# Patient Record
Sex: FEMALE | Race: WHITE | Employment: FULL TIME | URBAN - NONMETROPOLITAN AREA
[De-identification: names, ages, dates, MRNs, and addresses within clinical notes are randomized per-mention and may not be internally consistent; named-entity substitution may affect disease eponyms.]

---

## 2022-07-07 ENCOUNTER — HOSPITAL ENCOUNTER (EMERGENCY)
Age: 37
Discharge: HOME | End: 2022-07-07
Attending: SPECIALIST
Payer: MEDICAID

## 2022-07-07 ENCOUNTER — APPOINTMENT (OUTPATIENT)
Dept: GENERAL RADIOLOGY | Age: 37
End: 2022-07-07
Payer: MEDICAID

## 2022-07-07 VITALS
BODY MASS INDEX: 37.92 KG/M2 | WEIGHT: 227.6 LBS | HEART RATE: 89 BPM | DIASTOLIC BLOOD PRESSURE: 59 MMHG | SYSTOLIC BLOOD PRESSURE: 124 MMHG | RESPIRATION RATE: 17 BRPM | TEMPERATURE: 97.4 F | OXYGEN SATURATION: 96 % | HEIGHT: 65 IN

## 2022-07-07 DIAGNOSIS — D64.9 ANEMIA, UNSPECIFIED TYPE: ICD-10-CM

## 2022-07-07 DIAGNOSIS — R07.9 CHEST PAIN, UNSPECIFIED TYPE: Primary | ICD-10-CM

## 2022-07-07 LAB
ALBUMIN SERPL-MCNC: 3.9 G/DL (ref 3.5–5.2)
ALBUMIN/GLOBULIN RATIO: 1.2 (ref 1–2.5)
ALP BLD-CCNC: 68 U/L (ref 35–104)
ALT SERPL-CCNC: 19 U/L (ref 5–33)
ANION GAP SERPL CALCULATED.3IONS-SCNC: 11 MMOL/L (ref 9–17)
AST SERPL-CCNC: 17 U/L
BILIRUB SERPL-MCNC: <0.1 MG/DL (ref 0.3–1.2)
BUN BLDV-MCNC: 15 MG/DL (ref 6–20)
BUN/CREAT BLD: 23 (ref 9–20)
CALCIUM SERPL-MCNC: 9.2 MG/DL (ref 8.6–10.4)
CHLORIDE BLD-SCNC: 102 MMOL/L (ref 98–107)
CO2: 25 MMOL/L (ref 20–31)
CREAT SERPL-MCNC: 0.66 MG/DL (ref 0.5–0.9)
D-DIMER QUANTITATIVE: 0.46 MG/L FEU (ref 0–0.59)
EKG ATRIAL RATE: 88 BPM
EKG P AXIS: 68 DEGREES
EKG P-R INTERVAL: 152 MS
EKG Q-T INTERVAL: 370 MS
EKG QRS DURATION: 94 MS
EKG QTC CALCULATION (BAZETT): 447 MS
EKG R AXIS: 3 DEGREES
EKG T AXIS: 31 DEGREES
EKG VENTRICULAR RATE: 88 BPM
GFR AFRICAN AMERICAN: >60 ML/MIN
GFR NON-AFRICAN AMERICAN: >60 ML/MIN
GFR SERPL CREATININE-BSD FRML MDRD: ABNORMAL ML/MIN/{1.73_M2}
GLUCOSE BLD-MCNC: 91 MG/DL (ref 70–99)
HCT VFR BLD CALC: 31.4 % (ref 36.3–47.1)
HEMOGLOBIN: 9.9 G/DL (ref 11.9–15.1)
MAGNESIUM: 2 MG/DL (ref 1.6–2.6)
MCH RBC QN AUTO: 24.2 PG (ref 25.2–33.5)
MCHC RBC AUTO-ENTMCNC: 31.5 G/DL (ref 25.2–33.5)
MCV RBC AUTO: 76.8 FL (ref 82.6–102.9)
NRBC AUTOMATED: 0 PER 100 WBC
PDW BLD-RTO: 15.8 % (ref 11.8–14.4)
PLATELET # BLD: 418 K/UL (ref 138–453)
PMV BLD AUTO: 9.7 FL (ref 8.1–13.5)
POTASSIUM SERPL-SCNC: 3.8 MMOL/L (ref 3.7–5.3)
PRO-BNP: <20 PG/ML
RBC # BLD: 4.09 M/UL (ref 3.95–5.11)
SODIUM BLD-SCNC: 138 MMOL/L (ref 135–144)
TOTAL PROTEIN: 7.1 G/DL (ref 6.4–8.3)
TROPONIN, HIGH SENSITIVITY: <6 NG/L (ref 0–14)
WBC # BLD: 7.8 K/UL (ref 3.5–11.3)

## 2022-07-07 PROCEDURE — 99285 EMERGENCY DEPT VISIT HI MDM: CPT

## 2022-07-07 PROCEDURE — 84484 ASSAY OF TROPONIN QUANT: CPT

## 2022-07-07 PROCEDURE — 85379 FIBRIN DEGRADATION QUANT: CPT

## 2022-07-07 PROCEDURE — 6370000000 HC RX 637 (ALT 250 FOR IP): Performed by: SPECIALIST

## 2022-07-07 PROCEDURE — 36415 COLL VENOUS BLD VENIPUNCTURE: CPT

## 2022-07-07 PROCEDURE — 71045 X-RAY EXAM CHEST 1 VIEW: CPT

## 2022-07-07 PROCEDURE — 85027 COMPLETE CBC AUTOMATED: CPT

## 2022-07-07 PROCEDURE — 83735 ASSAY OF MAGNESIUM: CPT

## 2022-07-07 PROCEDURE — 83880 ASSAY OF NATRIURETIC PEPTIDE: CPT

## 2022-07-07 PROCEDURE — 80053 COMPREHEN METABOLIC PANEL: CPT

## 2022-07-07 PROCEDURE — 93005 ELECTROCARDIOGRAM TRACING: CPT | Performed by: SPECIALIST

## 2022-07-07 RX ORDER — ASPIRIN 81 MG/1
324 TABLET, CHEWABLE ORAL ONCE
Status: COMPLETED | OUTPATIENT
Start: 2022-07-07 | End: 2022-07-07

## 2022-07-07 RX ORDER — M-VIT,TX,IRON,MINS/CALC/FOLIC 27MG-0.4MG
1 TABLET ORAL DAILY
COMMUNITY

## 2022-07-07 RX ADMIN — ASPIRIN 81 MG 324 MG: 81 TABLET ORAL at 21:21

## 2022-07-07 ASSESSMENT — PAIN DESCRIPTION - LOCATION: LOCATION: CHEST

## 2022-07-07 ASSESSMENT — PAIN SCALES - GENERAL
PAINLEVEL_OUTOF10: 7
PAINLEVEL_OUTOF10: 0
PAINLEVEL_OUTOF10: 0

## 2022-07-07 ASSESSMENT — PAIN - FUNCTIONAL ASSESSMENT
PAIN_FUNCTIONAL_ASSESSMENT: 0-10
PAIN_FUNCTIONAL_ASSESSMENT: 0-10

## 2022-07-07 ASSESSMENT — ENCOUNTER SYMPTOMS
NAUSEA: 1
BACK PAIN: 0
SHORTNESS OF BREATH: 1

## 2022-07-07 ASSESSMENT — PAIN DESCRIPTION - FREQUENCY: FREQUENCY: CONTINUOUS

## 2022-07-07 ASSESSMENT — PAIN DESCRIPTION - ONSET: ONSET: ON-GOING

## 2022-07-07 NOTE — Clinical Note
Brenna Thomas was seen and treated in our emergency department on 7/7/2022. She may return to work on 07/08/2022. If you have any questions or concerns, please don't hesitate to call.       Laura Shane MD

## 2022-07-07 NOTE — Clinical Note
The patient has decided to leave against medical advice    She has normal mental status and adequate capacity to make medical decisions. The patient refuses hospital admission and wants to be discharged. The risks have been explained to the pat ient, including worsening illness, chronic pain, permanent disability, and death. The benefits of admission have also been explained, including the availability and proximity of nurses, physicians, monitoring, diagnostic testing, and treatment. The patient was able to understand and state the risks and benefits of hospital admission. This was witnessed by nurse Domo Cleveland and me. She had the opportunity to ask questions about her medical condition. The patient was treated to the exte nt that she would allow, and knows that she may return for care at any time.     Follow up has been discussed

## 2022-07-08 NOTE — ED NOTES
Pt requesting to go home. Pt states she feels fine and needs to relieve her . Education given on importance of second troponin draw in 2 hrs. Pt refused. MD aware.       Lauryn Kendall RN  07/07/22 1091

## 2022-07-08 NOTE — ED PROVIDER NOTES
[address]      Pt Name: Mark Reece  MRN: [id number]  [place] 1985  Date of evaluation: 7/7/2022      CHIEF COMPLAINT       Chief Complaint   Patient presents with    Chest Pain     chest pain initiated at 1200pm    Nausea         HISTORY OF PRESENT ILLNESS    Mark Reece is a 44 y.o. female who presents to the emergency department accompanied by her mother-in-law for evaluation of substernal chest pain since around noon today. Chest pain was 4 out of 10 in intensity initially and has gotten worse since 3 PM and it is 6 out of 10 in intensity. She has associated slight shortness of breath, nausea, lightheadedness and dizziness as well as swelling in the legs. She denies any vomiting, palpitations or diaphoresis. No history of cough, fever or chills. Pain has been constant since noon. There are no exacerbating or relieving factors and patient has not taken any medications for the above symptoms. She denies any history of hypertension, diabetes mellitus or known coronary artery disease. Her father had congestive heart failure. REVIEW OF SYSTEMS       Review of Systems   Constitutional: Negative for diaphoresis and fever. Respiratory: Positive for shortness of breath. Cardiovascular: Positive for chest pain and leg swelling. Gastrointestinal: Positive for nausea. Musculoskeletal: Negative for back pain. Neurological: Positive for dizziness and light-headedness. All other systems reviewed and are negative. PAST MEDICAL HISTORY    has no past medical history on file. SURGICAL HISTORY      has no past surgical history on file.     CURRENT MEDICATIONS       Discharge Medication List as of 7/7/2022 10:50 PM      CONTINUE these medications which have NOT CHANGED    Details   Multiple Vitamins-Minerals (THERAPEUTIC MULTIVITAMIN-MINERALS) tablet Take 1 tablet by mouth dailyHistorical Med             ALLERGIES     has No Known Allergies. FAMILY HISTORY     has no family status information on file. family history is not on file. SOCIAL HISTORY      reports that she has never smoked. She has never used smokeless tobacco. She reports that she does not drink alcohol and does not use drugs. PHYSICAL EXAM     INITIAL VITALS:  height is 5' 5\" (1.651 m) and weight is 227 lb 9.6 oz (103.2 kg). Her tympanic temperature is 97.4 °F (36.3 °C). Her blood pressure is 124/59 (abnormal) and her pulse is 89. Her respiration is 17 and oxygen saturation is 96%. Physical Exam  Vitals and nursing note reviewed. Constitutional:       Appearance: She is well-developed. HENT:      Head: Normocephalic and atraumatic. Nose: Nose normal.   Eyes:      Extraocular Movements: Extraocular movements intact. Pupils: Pupils are equal, round, and reactive to light. Cardiovascular:      Rate and Rhythm: Normal rate and regular rhythm. Heart sounds: Normal heart sounds. No murmur heard. Pulmonary:      Effort: Pulmonary effort is normal. No respiratory distress. Breath sounds: Normal breath sounds. Abdominal:      General: Bowel sounds are normal. There is no distension. Palpations: Abdomen is soft. Tenderness: There is no abdominal tenderness. Musculoskeletal:      Cervical back: Normal range of motion and neck supple. Skin:     General: Skin is warm and dry. Neurological:      General: No focal deficit present. Mental Status: She is alert and oriented to person, place, and time.           DIFFERENTIAL DIAGNOSIS/ MDM:     Bronchitis, Pneumonia, ACS, PE, Musculoskeletal pain, pneumothorax, thoracic aortic dissection, nonspecific chest pain, gastrointestinal in origin    HEART SCORE    Variable       Score   History  []Highly Suspicious      2     []Moderately Suspicious     1     [x]Slightly Suspicious      0     ECG   []Significant ST-depression     2     []Nonspecific Repolarization     1     [x]Normal       0     Age   []>72years old      3    []45-75 years old      1     [x]<39years old      0     Risk Factors  []>3 risk factors or history of atherosclerotic disease 2     [x]1 or 2 risk factors      1     []No risk factors      0     Troponin  []>3x normal limit      2     []1-3x normal limit      1     [x]< normal limit      0   Risk Factors: DM, current or recent (<one month) smoker, HTN, hyperlipidemia, family history of CAD or obesity     Total Score = 1    Score 0-3: 2.5% Major Acute Coronary Event over the next 6 weeks -> D/C home  Score 4-6: 20.3% MACE -> Admit for Observation  Score 7-10: 72.7% MACE ->[employer]  Chest Pain in the Emergency Room: A Multicenter Validation of the [address]. Mishel BE, Srinivas AJ, Pradip MERLIN, Mast TP, [employer], Mast EG, [place] SH, [facility]. Crit Pathw Cardiol. 2010 Sep; 9(3): 164-169.     Risk Stratification for Acute Coronary Syndrome   Family history of coronary artery disease - Yes  History of diabetes - No  History of hypertension - No  History of hyperlipidemia  - No  History of smoking - No  Cocaine use - No  Known coronary artery disease - No  (Patient is classified as low risk if he/she has one or less risk factors excluding diabetes and known coronary artery disease)    Risk Stratification for Thoracic Aortic Dissection (TAD)  Family history of TAD - No  History of connective tissue disorder (i.e. Marfans Syndrome, Koko-Danlos Syndrome) - No  Ponds Syndrome - No  History of hypertension - No  History of aortic valve disease - No  Pregnancy - No    Risk Stratification for Pulmonary Embolism (PE)  Previous PE - No  Malignancy - No  Obesity - Yes  Trauma - No  [place] filter - No  Pregnancy/postpartum - No  Smoking - No  Prior DVT - Yes  Immobilization (i.e. leg cast, travel) - No  Surgery within the last 60 days - No  Coagulation disorder - No  Estrogen medicine - No    DIAGNOSTIC were advised but patient refused. She is adamant about going home. She understands the benefits versus risks involved with only 1 set of cardiac markers. She will follow-up with her PCP. She signed out AMA. The patient has decided to leave against medical advice. She has normal mental status and adequate capacity to make medical decisions. The patient refuses hospital admission and wants to be discharged. The risks have been explained to the patient, including worsening illness, chronic pain, permanent disability, and death. The benefits of admission have also been explained, including the availability and proximity of nurses, physicians, monitoring, diagnostic testing, and treatment. The patient was able to understand and state the risks and benefits of hospital admission. She had the opportunity to ask questions about her medical condition. The patient was treated to the extent that she would allow, and knows that she may return for care at any time. Amanda Patel MD  Attending Emergency Physician    I have reviewed the disposition diagnosis with the patient and or their family/guardian. I have answered their questions and given discharge instructions. They voiced understanding of these instructions and did not have any further questions or complaints. Re-evaluation Notes    Patient is resting comfortably and does not appear to be in any pain or distress prior to discharge    CRITICAL CARE:   None        CONSULTS:      PROCEDURES:  None    FINAL IMPRESSION      1. Chest pain, unspecified type    2.  Anemia, unspecified type          DISPOSITION/PLAN   DISPOSITION [place]    Condition on Disposition    Stable    PATIENT REFERRED TO:  NESTOR Lowry - CNP  [address] [facility]  [phone]    Call in 1 day  For reevaluation of current symptoms    [address] [facility]  [address]  [phone]    If symptoms worsen, If chest pain recurs or any new symptoms appear      DISCHARGE MEDICATIONS:  Discharge Medication List as of 7/7/2022 10:50 PM          (Please note that portions of this note were completed with a voice recognition program.  Efforts were made to edit the dictations but occasionally words are mis-transcribed.)    Shin Martin MD,, MD, F.A.C.E.P.   Attending Emergency Physician                          Shin Martin MD  07/08/22 6924